# Patient Record
Sex: MALE | Race: WHITE
[De-identification: names, ages, dates, MRNs, and addresses within clinical notes are randomized per-mention and may not be internally consistent; named-entity substitution may affect disease eponyms.]

---

## 2019-11-27 ENCOUNTER — HOSPITAL ENCOUNTER (INPATIENT)
Dept: HOSPITAL 92 - ERS | Age: 44
LOS: 1 days | Discharge: HOME | DRG: 66 | End: 2019-11-28
Payer: COMMERCIAL

## 2019-11-27 VITALS — BODY MASS INDEX: 35.3 KG/M2

## 2019-11-27 DIAGNOSIS — R40.2142: ICD-10-CM

## 2019-11-27 DIAGNOSIS — E78.5: ICD-10-CM

## 2019-11-27 DIAGNOSIS — R40.2252: ICD-10-CM

## 2019-11-27 DIAGNOSIS — G89.29: ICD-10-CM

## 2019-11-27 DIAGNOSIS — H53.8: ICD-10-CM

## 2019-11-27 DIAGNOSIS — R40.2352: ICD-10-CM

## 2019-11-27 DIAGNOSIS — I10: ICD-10-CM

## 2019-11-27 DIAGNOSIS — Z79.82: ICD-10-CM

## 2019-11-27 DIAGNOSIS — I63.81: Primary | ICD-10-CM

## 2019-11-27 DIAGNOSIS — I63.89: ICD-10-CM

## 2019-11-27 LAB
ALBUMIN SERPL BCG-MCNC: 4.7 G/DL (ref 3.5–5)
ALP SERPL-CCNC: 97 U/L (ref 40–110)
ALT SERPL W P-5'-P-CCNC: 54 U/L (ref 8–55)
ANION GAP SERPL CALC-SCNC: 12 MMOL/L (ref 10–20)
APTT PPP: 27.7 SEC (ref 22.9–36.1)
AST SERPL-CCNC: 35 U/L (ref 5–34)
BASOPHILS # BLD AUTO: 0 THOU/UL (ref 0–0.2)
BASOPHILS NFR BLD AUTO: 0.3 % (ref 0–1)
BILIRUB SERPL-MCNC: 0.8 MG/DL (ref 0.2–1.2)
BUN SERPL-MCNC: 18 MG/DL (ref 8.9–20.6)
CALCIUM SERPL-MCNC: 9.6 MG/DL (ref 7.8–10.44)
CHD RISK SERPL-RTO: 6.4 (ref ?–4.5)
CHLORIDE SERPL-SCNC: 102 MMOL/L (ref 98–107)
CHOLEST SERPL-MCNC: 217 MG/DL
CO2 SERPL-SCNC: 28 MMOL/L (ref 22–29)
CREAT CL PREDICTED SERPL C-G-VRATE: 0 ML/MIN (ref 70–130)
D DIMER PPP FEU-MCNC: (no result) *MCG/ML (ref 0.27–0.43)
EOSINOPHIL # BLD AUTO: 0 THOU/UL (ref 0–0.7)
EOSINOPHIL NFR BLD AUTO: 0.5 % (ref 0–10)
GLOBULIN SER CALC-MCNC: 2.9 G/DL (ref 2.4–3.5)
GLUCOSE SERPL-MCNC: 153 MG/DL (ref 70–105)
HDLC SERPL-MCNC: 34 MG/DL
HGB BLD-MCNC: 15 G/DL (ref 14–18)
INR PPP: 1
LDLC SERPL CALC-MCNC: 137 MG/DL
LYMPHOCYTES # BLD: 2 THOU/UL (ref 1.2–3.4)
LYMPHOCYTES NFR BLD AUTO: 23.8 % (ref 21–51)
MCH RBC QN AUTO: 30 PG (ref 27–31)
MCV RBC AUTO: 86.1 FL (ref 78–98)
MONOCYTES # BLD AUTO: 0.3 THOU/UL (ref 0.11–0.59)
MONOCYTES NFR BLD AUTO: 3.8 % (ref 0–10)
NEUTROPHILS # BLD AUTO: 6.1 THOU/UL (ref 1.4–6.5)
NEUTROPHILS NFR BLD AUTO: 71.5 % (ref 42–75)
PLATELET # BLD AUTO: 218 THOU/UL (ref 130–400)
POTASSIUM SERPL-SCNC: 3.7 MMOL/L (ref 3.5–5.1)
PROTHROMBIN TIME: 13 SEC (ref 12–14.7)
RBC # BLD AUTO: 4.99 MILL/UL (ref 4.7–6.1)
SODIUM SERPL-SCNC: 138 MMOL/L (ref 136–145)
TRIGL SERPL-MCNC: 228 MG/DL (ref ?–150)
TROPONIN I SERPL DL<=0.01 NG/ML-MCNC: (no result) NG/ML (ref ?–0.03)
TROPONIN I SERPL DL<=0.01 NG/ML-MCNC: 0.03 NG/ML (ref ?–0.03)
WBC # BLD AUTO: 8.5 THOU/UL (ref 4.8–10.8)

## 2019-11-27 PROCEDURE — 93005 ELECTROCARDIOGRAM TRACING: CPT

## 2019-11-27 PROCEDURE — 85730 THROMBOPLASTIN TIME PARTIAL: CPT

## 2019-11-27 PROCEDURE — 70450 CT HEAD/BRAIN W/O DYE: CPT

## 2019-11-27 PROCEDURE — 96375 TX/PRO/DX INJ NEW DRUG ADDON: CPT

## 2019-11-27 PROCEDURE — 85379 FIBRIN DEGRADATION QUANT: CPT

## 2019-11-27 PROCEDURE — 71045 X-RAY EXAM CHEST 1 VIEW: CPT

## 2019-11-27 PROCEDURE — 36415 COLL VENOUS BLD VENIPUNCTURE: CPT

## 2019-11-27 PROCEDURE — 80061 LIPID PANEL: CPT

## 2019-11-27 PROCEDURE — 93880 EXTRACRANIAL BILAT STUDY: CPT

## 2019-11-27 PROCEDURE — 96365 THER/PROPH/DIAG IV INF INIT: CPT

## 2019-11-27 PROCEDURE — 85025 COMPLETE CBC W/AUTO DIFF WBC: CPT

## 2019-11-27 PROCEDURE — 80053 COMPREHEN METABOLIC PANEL: CPT

## 2019-11-27 PROCEDURE — 70553 MRI BRAIN STEM W/O & W/DYE: CPT

## 2019-11-27 PROCEDURE — 84484 ASSAY OF TROPONIN QUANT: CPT

## 2019-11-27 PROCEDURE — 85610 PROTHROMBIN TIME: CPT

## 2019-11-27 NOTE — RAD
Chest one view



HISTORY: Altered mental status. Chest pain.



FINDINGS: No comparison. Cardiac silhouette is magnified by projection. Pulmonary vasculature is unre
markable. Mediastinum is midline. No lobar consolidation or evidence of pneumothorax. Cardiac

monitor leads overlie the chest.











IMPRESSION: No active cardiopulmonary abnormalities are demonstrated.



Reported By: YASMANI Lunsford 

Electronically Signed:  11/27/2019 4:12 PM

## 2019-11-27 NOTE — CT
CT BRAIN NONCONTRAST:

11/27/2019

4:23 p.m.

 

HISTORY:

A 44-year-old male with headache. History of recent stroke last Thursday, demonstrated on imaging jacky
dy at outside facility.

 

COMPARISON:

None available.

 

FINDINGS:

There is an approximately 1 x 0.5 cm focus of moderate hypodensity in the left basal ganglia. Ventric
les are normal in size and configuration. No mass effect or midline shift. No acute intra-axial or ex
tra-axial hemorrhage or any other extra-axial fluid collection. The visualized upper portion of the r
ight maxillary sinus is opacified. Mucosal thickening in adjacent right lower ethmoid air cells. The 
sphenoid, left ethmoid and frontal sinuses are grossly clear. Bilateral tympanomastoid cavities are g
rossly clear. No calvarial fracture.

 

IMPRESSION:

1. Lacunar infarction at left basal ganglia, presumably subacute, especially given history.

 

2. No acute intracranial hemorrhage or mass effect.

 

3. Right maxillary sinus mucosal disease, incompletely imaged.

 

EVELYN DAVE

 

POS: MURIEL

## 2019-11-27 NOTE — HP
CHIEF COMPLAINT:  Blurred vision, left-sided, dizziness, headache, and chest 
pain.



HISTORY OF PRESENT ILLNESS:  Mr. Gareth Jj is a 44-year-old  
male

with past medical history of hypertension, hyperlipidemia, and chronic pain.  
He was

in [place] last Thursday, woke up with dizziness and blurred vision in the 
left

side of eye.  He waited for some time, got better and he went home, stayed for 2

days, and then he went to the [facility], where he was admitted 
because of

the decreased vision on the left eye and dizziness.  He was evaluated, had a CT 
scan

and MRI done and he was told he had a CVA.  He also had chest pain, but he did 
not

see any cardiologist there.  He was actually transferred from [facility]

to [facility], where he has stayed for some time for days.  Then, he was

trying to get transferred to [facility], which was unsuccessful, so he

signed out AMA and his wife yumiko him from [place] to [facility] and

presented in the ER.  He still has chest pain this morning, which is pressure-
like,

nonradiating, associated with some dizziness, but no nausea or vomiting.  He 
also

has headache. His visual field defect on the left side is still there.  The 
patient

underwent CT scan here, which showed subacute lacunar infarct in the basal 
ganglia.

He is admitted for further evaluation and he also received IV fluid 1 L bolus 
and

metoclopramide injection. 



PAST MEDICAL HISTORY:  

1. Hypertension.

2. Hyperlipidemia.

3. Chronic pain.



PAST SURGICAL HISTORY:  Nothing significant.



CURRENT MEDICATIONS:  The patient is on;

1. Simvastatin 20 mg daily.

2. Fenofibrate 160 daily.

3. Lisinopril 20 mg b.i.d.

4. Aspirin 81 mg daily.



ALLERGIES:  NKDA.



FAMILY HISTORY:  Nothing contributory.



SOCIAL HISTORY:  The patient lives with family.  No history of smoking.  No 
history

of alcohol. 



REVIEW OF SYSTEMS:  CARDIOVASCULAR: Has chest pain.  No shortness of breath. 

RESPIRATORY: No fever or cough. 

GASTROINTESTINAL:  No nausea or vomiting.  No abdominal pain. 

CENTRAL NERVOUS SYSTEM:  Has headache, dizziness and blurry vision in the left 
eye.



PHYSICAL EXAMINATION:

VITAL SIGNS:  Temperature 98, pulse 85, respirations 20, blood pressure 
initially

160/108, now 130/70. 

HEENT: Head is normocephalic, atraumatic.  Pupils are equal and reactive.

Nasopharynx is pale and dry.  Vision, he has field of vision loss in the left 
eye. 

NECK:  Supple.  No JVD. 

LUNGS:  Bilateral air entry present.  No rales, no rhonchi. 

HEART:  S1 and S2 regular. 

ABDOMEN:  Soft.  No distention.  No tenderness.  Normal bowel sounds. 

RECTAL:  No symptoms. 

NEUROLOGIC:  The patient is alert, awake, oriented x3.  Motor system, power 4/5 
in

all extremities.  Deep tendon reflex 2+ bilaterally.  Plantars downgoing. 

he has visual field defect in the left eye. 



LABORATORY DATA:  CBC shows WBC 8.5, hemoglobin 15, hematocrit 43, platelets 
218.

Metabolic panel; sodium 138, potassium 3.7, chloride 102, CO2 28, urea nitrogen 
18,

creatinine 1.6, glucose 153. 



CT scan of the brain showed lacunar infarct, left basal ganglia.  Chest x-ray

negative. 



ASSESSMENT:  

1. Acute cerebrovascular accident with left visual field loss.

2. Subacute infarct in the left basal ganglia.

3. Chest pain, rule out myocardial infarction.

4. Hypertension.

5. Dizziness and weakness.



PLAN:  

1. Vital signs q.4 hours.

2. Activity as tolerated.

3. Allergies:  NKDA.

4. Hep-Lock.

5. Diet:  Heart healthy.

6. Aspirin 325 mg daily.

7. Plavix 75 mg daily.

8. Lipitor 40 mg at bedtime.

9. Continue home medications.

10. Neurology consult.

11. MRI of the brain.

12. Stress test.







Job ID:  [id number]



[facility]

## 2019-11-28 VITALS — DIASTOLIC BLOOD PRESSURE: 94 MMHG | SYSTOLIC BLOOD PRESSURE: 174 MMHG

## 2019-11-28 VITALS — TEMPERATURE: 98.1 F

## 2019-11-28 LAB
CHD RISK SERPL-RTO: 5.6 (ref ?–4.5)
CHOLEST SERPL-MCNC: 180 MG/DL
HDLC SERPL-MCNC: 32 MG/DL
LDLC SERPL CALC-MCNC: 115 MG/DL
TRIGL SERPL-MCNC: 167 MG/DL (ref ?–150)

## 2019-11-28 NOTE — CON
DATE OF CONSULTATION:  11/28/2019



CONSULTING PHYSICIAN:  [facility].



IMPRESSION:  

1. Left basal ganglia stroke.

2. Clinical exam suggest a right occipital lobe stroke.

3. Hypertension.

4. Reported history of carotid disease.



PLAN:  

1. MRI of the brain.

2. Continue aspirin and statin.

3. Carotid ultrasound.



HISTORY OF PRESENT ILLNESS:  Mr. Servin is a 44-year-old man, who was admitted to

an [facility] hospital with complaints of left occipital headache and blurred vision

on the left.  He reportedly had an MRI of the brain, which confirmed two strokes.

He had an echocardiogram which reportedly was normal.  He was told that he had some

carotid disease.  He was started on aspirin.  He had already been on a statin.  He

was transferred to [place] initially and then his wife picked him up and brought him

to [place].  He has no past history of stroke prior to this.  He was

experiencing some substernal chest pain that radiated back to the scapula.  He was a

bit concerned that it might be cardiac.  They came in here for further evaluation of

this.  Stress test is pending.  Blood pressure was 179/111.  His laboratory study

showed negative troponins.  He has otherwise been stable.  He continues to complain

of a headache and blurred vision on the left. 



PAST MEDICAL HISTORY:  Hypertension, hyperlipidemia.



SOCIAL HISTORY:  No tobacco use.  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



ALLERGIES:  NONE.



MEDICATIONS:  Reviewed.



FAMILY HISTORY:  Positive for coronary artery disease.



REVIEW OF SYSTEMS:  10 system review of systems otherwise negative.



PHYSICAL EXAMINATION:

VITAL SIGNS:  Blood pressure 142/88, pulse 56, respirations 12, temperature 98.1. 

HEENT:  Pupils equal, conjunctivae clear.  Oropharynx clear.  Cranium normocephalic

and atraumatic. 

NECK:  Supple.  No lymphadenopathy. 

EXTREMITIES:  No cyanosis or edema. 

NEUROLOGIC:  He was alert and appropriate.  His speech is fluent and clear.  Cranial

nerve exam showed what appeared to be a left homonymous hemianopsia.  His motor exam

showed no deficits.  There is no tremor or dysmetria.  There are no sensory

deficits.  He can walk independently. 



LABORATORY STUDIES:  Unremarkable CBC, coags, and chemistry panel.  His initial

cholesterol ratio was 6.4, but it was re-checked today and it was 5.6. 



DIAGNOSTIC STUDIES:  EKG shows a sinus rhythm. 



CT scan of the brain was reviewed and shows a subacute area of infarction in the

left basal ganglia.  I do not see any occipital lobe ischemia. 



SUMMARY:  This is a young man __________ with a new stroke.  He has been started on

aspirin.  We need to complete his workup and determine whether if his chest pain is

of any significance. 







Job ID:  [id number]

## 2019-11-28 NOTE — MRI
MRI BRAIN WITH AND WITHOUT CONTRAST:

 

Date:  11/28/19 

 

INDICATION:

Follow-up CT 11/27/19 which indicated lacunar infarct in the left basal ganglia. 

 

FINDINGS:

Ventricles have normal size and position. There are scattered hyperintensities in the deep white katie
er and basal ganglia consistent with chronic ischemic change. There is gliosis in the left basal gang
nikkie; however, no restricted diffusion. This may represent a focus of old lacunar infarct. 

 

Diffusion-weighted images, however, show several scattered foci of restricted diffusion involving the
 right occipital lobe and posterior right temporal lobe in the distribution of the right posterior ce
rebral artery. There are focal areas of cortical involvement indicating acute infarcts in this distri
bution. No abnormal enhancement. 

 

The intracranial internal carotid arteries and proximal cerebral arteries show flow-voids. The basila
r artery is patent. The P1 segment of the right posterior cerebral artery is not well seen and I vinh
ot confirm flow-void in this segment of the right PCA. 

 

IMPRESSION: 

1.  Scattered foci of acute infarct involving the right occipital lobe and posterior right temporal l
obe in a distribution of the right posterior cerebral artery. 

 

2.  There are chronic ischemic changes in the deep white matter and basal ganglia. No other areas of 
acute infarct. 

 

 

POS: OFF

## 2019-11-28 NOTE — ULT
BILATERAL CAROTID DUPLEX ULTRASOUND:



HISTORY: Strokelike symptoms



TECHNIQUE:

Grayscale, color-flow and spectral Doppler ultrasound imaging of the extracranial carotid artery syst
ems and vertebral arteries was performed bilaterally.



FINDINGS:

No large amount of echogenic plaque is seen involving the common carotid or internal carotid arteries
. 



The peak systolic velocity in the right ICA measures 64.2 cm/s.  The peak systolic velocity in the ri
ght CCA measures 69.6 cm/s.



The peak systolic velocity in the left ICA measures  103.8  cm/s.  The peak systolic velocity in the 
left CCA measures  89.2  cm/s.



The right IC/CC ratio is0.92.  The left IC/CC ratio is 1.16.



Vertebral flow:  antegrade, bilaterally.  .



IMPRESSION: No hemodynamically significant stenosis of Both ICAs. 



Reported By: Randy Foley 

Electronically Signed:  11/28/2019 10:49 AM

## 2020-02-05 ENCOUNTER — HOSPITAL ENCOUNTER (OUTPATIENT)
Dept: HOSPITAL 92 - CCL | Age: 45
Discharge: HOME | End: 2020-02-05
Attending: INTERNAL MEDICINE
Payer: COMMERCIAL

## 2020-02-05 VITALS — BODY MASS INDEX: 34.9 KG/M2

## 2020-02-05 DIAGNOSIS — I10: ICD-10-CM

## 2020-02-05 DIAGNOSIS — Z79.82: ICD-10-CM

## 2020-02-05 DIAGNOSIS — E78.5: ICD-10-CM

## 2020-02-05 DIAGNOSIS — Z79.899: ICD-10-CM

## 2020-02-05 DIAGNOSIS — I63.9: Primary | ICD-10-CM

## 2020-02-05 PROCEDURE — B24BZZ4 ULTRASONOGRAPHY OF HEART WITH AORTA, TRANSESOPHAGEAL: ICD-10-PCS | Performed by: INTERNAL MEDICINE

## 2020-02-05 PROCEDURE — 93312 ECHO TRANSESOPHAGEAL: CPT

## 2020-02-05 NOTE — OP
DATE OF PROCEDURE:  02/05/2020



PROCEDURE PERFORMED:  Transesophageal echocardiogram.



PREPROCEDURE DIAGNOSIS:  CVA, unknown etiology.



POSTPROCEDURE DIAGNOSIS:  No significant findings of emboli.



DESCRIPTION OF PROCEDURE:  The patient was consented for the procedure.  Conscious

sedation was performed with propofol.  The probe was passed easily into esophagus. 



FINDINGS:  Overall LVEF estimated at 55% to 60%.  Left ventricle appears to have

normal size and function.  No mass or vegetation present.  The mitral valve is well

visualized.  No mass or vegetation present.  Left atrial appendage also well

visualized.  Normal velocities were noted with normal contraction.  Left atrium also

appeared normal without mass or vegetations.  The aortic valve has 3 cusps.  There

is normal excursion.  No mass or vegetation present in the aortic valve, LVOT, or

aorta.  The RA and RV are of normal size and function.  The tricuspid valve appears

normal.  The pulmonary valve appears normal.  Mobile study also performed showing no

left-to-right or right-to-left shunt.  Aorta with mild atherosclerosis. 



IMPRESSION:  Normal appearing transesophageal echocardiogram with no mass or

vegetation present. 







Job ID:  [id number]

## 2022-06-16 ENCOUNTER — HOSPITAL ENCOUNTER (OUTPATIENT)
Dept: HOSPITAL 92 - ERS | Age: 47
Setting detail: OBSERVATION
LOS: 1 days | Discharge: HOME | End: 2022-06-17
Attending: INTERNAL MEDICINE | Admitting: INTERNAL MEDICINE
Payer: SELF-PAY

## 2022-06-16 VITALS — BODY MASS INDEX: 39.3 KG/M2

## 2022-06-16 DIAGNOSIS — E78.00: ICD-10-CM

## 2022-06-16 DIAGNOSIS — N18.9: ICD-10-CM

## 2022-06-16 DIAGNOSIS — Z20.822: ICD-10-CM

## 2022-06-16 DIAGNOSIS — Z86.711: ICD-10-CM

## 2022-06-16 DIAGNOSIS — I12.9: ICD-10-CM

## 2022-06-16 DIAGNOSIS — I08.1: ICD-10-CM

## 2022-06-16 DIAGNOSIS — Z79.82: ICD-10-CM

## 2022-06-16 DIAGNOSIS — Z79.899: ICD-10-CM

## 2022-06-16 DIAGNOSIS — Z86.73: ICD-10-CM

## 2022-06-16 DIAGNOSIS — R42: ICD-10-CM

## 2022-06-16 DIAGNOSIS — Z79.84: ICD-10-CM

## 2022-06-16 DIAGNOSIS — E11.22: ICD-10-CM

## 2022-06-16 DIAGNOSIS — R51.9: Primary | ICD-10-CM

## 2022-06-16 DIAGNOSIS — E78.1: ICD-10-CM

## 2022-06-16 LAB
ALBUMIN SERPL BCG-MCNC: 4.4 G/DL (ref 3.5–5)
ALP SERPL-CCNC: 63 U/L (ref 40–110)
ALT SERPL W P-5'-P-CCNC: 39 U/L (ref 8–55)
ANION GAP SERPL CALC-SCNC: 14 MMOL/L (ref 10–20)
APTT PPP: 28.8 SEC (ref 22.9–36.1)
AST SERPL-CCNC: 27 U/L (ref 5–34)
BASOPHILS # BLD AUTO: 0 THOU/UL (ref 0–0.2)
BASOPHILS NFR BLD AUTO: 0.3 % (ref 0–1)
BILIRUB SERPL-MCNC: 0.6 MG/DL (ref 0.2–1.2)
BUN SERPL-MCNC: 25 MG/DL (ref 8.9–20.6)
CALCIUM SERPL-MCNC: 9.5 MG/DL (ref 7.8–10.44)
CHLORIDE SERPL-SCNC: 102 MMOL/L (ref 98–107)
CO2 SERPL-SCNC: 26 MMOL/L (ref 22–29)
CREAT CL PREDICTED SERPL C-G-VRATE: 0 ML/MIN (ref 70–130)
EOSINOPHIL # BLD AUTO: 0.1 THOU/UL (ref 0–0.7)
EOSINOPHIL NFR BLD AUTO: 1.3 % (ref 0–10)
GLOBULIN SER CALC-MCNC: 3.1 G/DL (ref 2.4–3.5)
GLUCOSE SERPL-MCNC: 151 MG/DL (ref 70–105)
HGB BLD-MCNC: 15.2 G/DL (ref 14–18)
INR PPP: 1.1
LYMPHOCYTES # BLD: 2.1 THOU/UL (ref 1.2–3.4)
LYMPHOCYTES NFR BLD AUTO: 26.5 % (ref 21–51)
MCH RBC QN AUTO: 28.3 PG (ref 27–31)
MCV RBC AUTO: 86.6 FL (ref 78–98)
MONOCYTES # BLD AUTO: 0.4 THOU/UL (ref 0.11–0.59)
MONOCYTES NFR BLD AUTO: 5.3 % (ref 0–10)
NEUTROPHILS # BLD AUTO: 5.3 THOU/UL (ref 1.4–6.5)
NEUTROPHILS NFR BLD AUTO: 66.7 % (ref 42–75)
PLATELET # BLD AUTO: 217 THOU/UL (ref 130–400)
POTASSIUM SERPL-SCNC: 4.1 MMOL/L (ref 3.5–5.1)
PROTHROMBIN TIME: 13.9 SEC (ref 12–14.7)
RBC # BLD AUTO: 5.37 MILL/UL (ref 4.7–6.1)
SODIUM SERPL-SCNC: 138 MMOL/L (ref 136–145)
WBC # BLD AUTO: 7.9 THOU/UL (ref 4.8–10.8)

## 2022-06-16 PROCEDURE — 84484 ASSAY OF TROPONIN QUANT: CPT

## 2022-06-16 PROCEDURE — 80053 COMPREHEN METABOLIC PANEL: CPT

## 2022-06-16 PROCEDURE — 85610 PROTHROMBIN TIME: CPT

## 2022-06-16 PROCEDURE — U0003 INFECTIOUS AGENT DETECTION BY NUCLEIC ACID (DNA OR RNA); SEVERE ACUTE RESPIRATORY SYNDROME CORONAVIRUS 2 (SARS-COV-2) (CORONAVIRUS DISEASE [COVID-19]), AMPLIFIED PROBE TECHNIQUE, MAKING USE OF HIGH THROUGHPUT TECHNOLOGIES AS DESCRIBED BY CMS-2020-01-R: HCPCS

## 2022-06-16 PROCEDURE — 96375 TX/PRO/DX INJ NEW DRUG ADDON: CPT

## 2022-06-16 PROCEDURE — 80061 LIPID PANEL: CPT

## 2022-06-16 PROCEDURE — 70498 CT ANGIOGRAPHY NECK: CPT

## 2022-06-16 PROCEDURE — 96365 THER/PROPH/DIAG IV INF INIT: CPT

## 2022-06-16 PROCEDURE — 70496 CT ANGIOGRAPHY HEAD: CPT

## 2022-06-16 PROCEDURE — 80048 BASIC METABOLIC PNL TOTAL CA: CPT

## 2022-06-16 PROCEDURE — 93306 TTE W/DOPPLER COMPLETE: CPT

## 2022-06-16 PROCEDURE — G0378 HOSPITAL OBSERVATION PER HR: HCPCS

## 2022-06-16 PROCEDURE — 96376 TX/PRO/DX INJ SAME DRUG ADON: CPT

## 2022-06-16 PROCEDURE — 85025 COMPLETE CBC W/AUTO DIFF WBC: CPT

## 2022-06-16 PROCEDURE — 36415 COLL VENOUS BLD VENIPUNCTURE: CPT

## 2022-06-16 PROCEDURE — 71045 X-RAY EXAM CHEST 1 VIEW: CPT

## 2022-06-16 PROCEDURE — 93005 ELECTROCARDIOGRAM TRACING: CPT

## 2022-06-16 PROCEDURE — 85730 THROMBOPLASTIN TIME PARTIAL: CPT

## 2022-06-16 PROCEDURE — 70551 MRI BRAIN STEM W/O DYE: CPT

## 2022-06-16 PROCEDURE — U0005 INFEC AGEN DETEC AMPLI PROBE: HCPCS

## 2022-06-16 PROCEDURE — 94760 N-INVAS EAR/PLS OXIMETRY 1: CPT

## 2022-06-17 VITALS — TEMPERATURE: 97.8 F

## 2022-06-17 VITALS — SYSTOLIC BLOOD PRESSURE: 154 MMHG | DIASTOLIC BLOOD PRESSURE: 78 MMHG

## 2022-06-17 LAB
ANION GAP SERPL CALC-SCNC: 14 MMOL/L (ref 10–20)
BUN SERPL-MCNC: 20 MG/DL (ref 8.9–20.6)
CALCIUM SERPL-MCNC: 9 MG/DL (ref 7.8–10.44)
CHD RISK SERPL-RTO: 6.3 (ref ?–4.5)
CHLORIDE SERPL-SCNC: 100 MMOL/L (ref 98–107)
CHOLEST SERPL-MCNC: 201 MG/DL
CO2 SERPL-SCNC: 26 MMOL/L (ref 22–29)
CREAT CL PREDICTED SERPL C-G-VRATE: 0 ML/MIN (ref 70–130)
GLUCOSE SERPL-MCNC: 112 MG/DL (ref 70–105)
HDLC SERPL-MCNC: 32 MG/DL
LDLC SERPL CALC-MCNC: 129 MG/DL
POTASSIUM SERPL-SCNC: 3.6 MMOL/L (ref 3.5–5.1)
SODIUM SERPL-SCNC: 136 MMOL/L (ref 136–145)
TRIGL SERPL-MCNC: 201 MG/DL (ref ?–150)

## 2022-06-23 ENCOUNTER — HOSPITAL ENCOUNTER (EMERGENCY)
Dept: HOSPITAL 92 - ERS | Age: 47
Discharge: LEFT BEFORE DISCHARGE | End: 2022-06-23
Payer: COMMERCIAL

## 2022-06-23 DIAGNOSIS — Z53.21: Primary | ICD-10-CM

## 2022-06-23 LAB
ALBUMIN SERPL BCG-MCNC: 4.5 G/DL (ref 3.5–5)
ALP SERPL-CCNC: 66 U/L (ref 40–110)
ALT SERPL W P-5'-P-CCNC: 41 U/L (ref 8–55)
ANION GAP SERPL CALC-SCNC: 17 MMOL/L (ref 10–20)
AST SERPL-CCNC: 23 U/L (ref 5–34)
BASOPHILS # BLD AUTO: 0 THOU/UL (ref 0–0.2)
BASOPHILS NFR BLD AUTO: 0.4 % (ref 0–1)
BILIRUB SERPL-MCNC: 0.5 MG/DL (ref 0.2–1.2)
BUN SERPL-MCNC: 23 MG/DL (ref 8.9–20.6)
CALCIUM SERPL-MCNC: 9.6 MG/DL (ref 7.8–10.44)
CHLORIDE SERPL-SCNC: 103 MMOL/L (ref 98–107)
CO2 SERPL-SCNC: 21 MMOL/L (ref 22–29)
CREAT CL PREDICTED SERPL C-G-VRATE: 0 ML/MIN (ref 70–130)
EOSINOPHIL # BLD AUTO: 0.1 THOU/UL (ref 0–0.7)
EOSINOPHIL NFR BLD AUTO: 1.2 % (ref 0–10)
GLOBULIN SER CALC-MCNC: 3.2 G/DL (ref 2.4–3.5)
GLUCOSE SERPL-MCNC: 139 MG/DL (ref 70–105)
HGB BLD-MCNC: 15.1 G/DL (ref 14–18)
LIPASE SERPL-CCNC: 56 U/L (ref 8–78)
LYMPHOCYTES # BLD: 2.3 THOU/UL (ref 1.2–3.4)
LYMPHOCYTES NFR BLD AUTO: 26.1 % (ref 21–51)
MCH RBC QN AUTO: 28.2 PG (ref 27–31)
MCV RBC AUTO: 87.5 FL (ref 78–98)
MONOCYTES # BLD AUTO: 0.6 THOU/UL (ref 0.11–0.59)
MONOCYTES NFR BLD AUTO: 6.6 % (ref 0–10)
NEUTROPHILS # BLD AUTO: 5.9 THOU/UL (ref 1.4–6.5)
NEUTROPHILS NFR BLD AUTO: 65.7 % (ref 42–75)
PLATELET # BLD AUTO: 229 THOU/UL (ref 130–400)
POTASSIUM SERPL-SCNC: 3.8 MMOL/L (ref 3.5–5.1)
RBC # BLD AUTO: 5.34 MILL/UL (ref 4.7–6.1)
SODIUM SERPL-SCNC: 137 MMOL/L (ref 136–145)
WBC # BLD AUTO: 8.9 THOU/UL (ref 4.8–10.8)

## 2022-06-23 PROCEDURE — 71045 X-RAY EXAM CHEST 1 VIEW: CPT

## 2022-06-23 PROCEDURE — 84484 ASSAY OF TROPONIN QUANT: CPT

## 2022-06-23 PROCEDURE — 36415 COLL VENOUS BLD VENIPUNCTURE: CPT

## 2022-06-23 PROCEDURE — 93005 ELECTROCARDIOGRAM TRACING: CPT

## 2022-06-23 PROCEDURE — 83690 ASSAY OF LIPASE: CPT

## 2022-06-23 PROCEDURE — 80053 COMPREHEN METABOLIC PANEL: CPT

## 2022-06-23 PROCEDURE — 85025 COMPLETE CBC W/AUTO DIFF WBC: CPT

## 2022-06-24 ENCOUNTER — HOSPITAL ENCOUNTER (EMERGENCY)
Dept: HOSPITAL 92 - ERS | Age: 47
Discharge: HOME | End: 2022-06-24
Payer: COMMERCIAL

## 2022-06-24 DIAGNOSIS — I10: ICD-10-CM

## 2022-06-24 DIAGNOSIS — Z79.82: ICD-10-CM

## 2022-06-24 DIAGNOSIS — Z79.899: ICD-10-CM

## 2022-06-24 DIAGNOSIS — R51.9: Primary | ICD-10-CM

## 2022-06-24 DIAGNOSIS — E11.9: ICD-10-CM

## 2022-06-24 DIAGNOSIS — Z79.84: ICD-10-CM

## 2022-06-24 LAB
ALBUMIN SERPL BCG-MCNC: 4.5 G/DL (ref 3.5–5)
ALP SERPL-CCNC: 66 U/L (ref 40–110)
ALT SERPL W P-5'-P-CCNC: 40 U/L (ref 8–55)
ANION GAP SERPL CALC-SCNC: 15 MMOL/L (ref 10–20)
AST SERPL-CCNC: 23 U/L (ref 5–34)
BASOPHILS # BLD AUTO: 0 THOU/UL (ref 0–0.2)
BASOPHILS NFR BLD AUTO: 0.6 % (ref 0–1)
BILIRUB SERPL-MCNC: 0.5 MG/DL (ref 0.2–1.2)
BUN SERPL-MCNC: 26 MG/DL (ref 8.9–20.6)
CALCIUM SERPL-MCNC: 9.7 MG/DL (ref 7.8–10.44)
CHLORIDE SERPL-SCNC: 100 MMOL/L (ref 98–107)
CO2 SERPL-SCNC: 29 MMOL/L (ref 22–29)
CREAT CL PREDICTED SERPL C-G-VRATE: 0 ML/MIN (ref 70–130)
CRP SERPL-MCNC: 0.88 MG/DL
EOSINOPHIL # BLD AUTO: 0.1 THOU/UL (ref 0–0.7)
EOSINOPHIL NFR BLD AUTO: 1.3 % (ref 0–10)
GLOBULIN SER CALC-MCNC: 3.1 G/DL (ref 2.4–3.5)
GLUCOSE SERPL-MCNC: 146 MG/DL (ref 70–105)
HGB BLD-MCNC: 15.5 G/DL (ref 14–18)
LYMPHOCYTES # BLD: 1.9 THOU/UL (ref 1.2–3.4)
LYMPHOCYTES NFR BLD AUTO: 25.9 % (ref 21–51)
MAGNESIUM SERPL-MCNC: 2 MG/DL (ref 1.6–2.6)
MCH RBC QN AUTO: 28.8 PG (ref 27–31)
MCV RBC AUTO: 86.7 FL (ref 78–98)
MONOCYTES # BLD AUTO: 0.4 THOU/UL (ref 0.11–0.59)
MONOCYTES NFR BLD AUTO: 5.7 % (ref 0–10)
NEUTROPHILS # BLD AUTO: 4.8 THOU/UL (ref 1.4–6.5)
NEUTROPHILS NFR BLD AUTO: 66.4 % (ref 42–75)
PLATELET # BLD AUTO: 216 THOU/UL (ref 130–400)
POTASSIUM SERPL-SCNC: 3.9 MMOL/L (ref 3.5–5.1)
RBC # BLD AUTO: 5.39 MILL/UL (ref 4.7–6.1)
SODIUM SERPL-SCNC: 140 MMOL/L (ref 136–145)
WBC # BLD AUTO: 7.3 THOU/UL (ref 4.8–10.8)

## 2022-06-24 PROCEDURE — 85025 COMPLETE CBC W/AUTO DIFF WBC: CPT

## 2022-06-24 PROCEDURE — 93005 ELECTROCARDIOGRAM TRACING: CPT

## 2022-06-24 PROCEDURE — 83735 ASSAY OF MAGNESIUM: CPT

## 2022-06-24 PROCEDURE — 86140 C-REACTIVE PROTEIN: CPT

## 2022-06-24 PROCEDURE — 96375 TX/PRO/DX INJ NEW DRUG ADDON: CPT

## 2022-06-24 PROCEDURE — 80053 COMPREHEN METABOLIC PANEL: CPT

## 2022-06-24 PROCEDURE — 96374 THER/PROPH/DIAG INJ IV PUSH: CPT

## 2022-06-24 PROCEDURE — 84484 ASSAY OF TROPONIN QUANT: CPT

## 2022-06-24 PROCEDURE — 85652 RBC SED RATE AUTOMATED: CPT

## 2022-06-29 ENCOUNTER — HOSPITAL ENCOUNTER (OUTPATIENT)
Dept: HOSPITAL 92 - ULT | Age: 47
Discharge: HOME | End: 2022-06-29
Attending: INTERNAL MEDICINE
Payer: COMMERCIAL

## 2022-06-29 DIAGNOSIS — N18.2: Primary | ICD-10-CM

## 2022-06-29 PROCEDURE — 76770 US EXAM ABDO BACK WALL COMP: CPT

## 2022-06-29 PROCEDURE — 93975 VASCULAR STUDY: CPT

## 2022-07-20 ENCOUNTER — HOSPITAL ENCOUNTER (OUTPATIENT)
Dept: HOSPITAL 92 - CTENTCT | Age: 47
Discharge: HOME | End: 2022-07-20
Attending: OTOLARYNGOLOGY
Payer: COMMERCIAL

## 2022-07-20 DIAGNOSIS — J32.9: Primary | ICD-10-CM

## 2022-07-20 PROCEDURE — 70486 CT MAXILLOFACIAL W/O DYE: CPT

## 2022-09-20 ENCOUNTER — HOSPITAL ENCOUNTER (OUTPATIENT)
Dept: HOSPITAL 92 - SDC | Age: 47
Discharge: HOME | End: 2022-09-20
Attending: OTOLARYNGOLOGY
Payer: COMMERCIAL

## 2022-09-20 VITALS — BODY MASS INDEX: 39.1 KG/M2

## 2022-09-20 DIAGNOSIS — J34.89: Primary | ICD-10-CM

## 2022-09-20 DIAGNOSIS — J32.4: ICD-10-CM

## 2022-09-20 DIAGNOSIS — J34.3: ICD-10-CM

## 2022-09-20 DIAGNOSIS — J34.2: ICD-10-CM

## 2022-09-20 DIAGNOSIS — Z79.84: ICD-10-CM

## 2022-09-20 DIAGNOSIS — J30.89: ICD-10-CM

## 2022-09-20 DIAGNOSIS — E11.9: ICD-10-CM

## 2022-09-20 DIAGNOSIS — Z79.899: ICD-10-CM

## 2022-09-20 DIAGNOSIS — J33.8: ICD-10-CM

## 2022-09-20 DIAGNOSIS — B48.8: ICD-10-CM

## 2022-09-20 DIAGNOSIS — Z20.822: ICD-10-CM

## 2022-09-20 DIAGNOSIS — Z79.82: ICD-10-CM

## 2022-09-20 LAB
ANION GAP SERPL CALC-SCNC: 15 MMOL/L (ref 10–20)
BASOPHILS # BLD AUTO: 0 THOU/UL (ref 0–0.2)
BASOPHILS NFR BLD AUTO: 0.6 % (ref 0–1)
BUN SERPL-MCNC: 18 MG/DL (ref 8.9–20.6)
CALCIUM SERPL-MCNC: 9.4 MG/DL (ref 7.8–10.44)
CHLORIDE SERPL-SCNC: 102 MMOL/L (ref 98–107)
CO2 SERPL-SCNC: 25 MMOL/L (ref 22–29)
CREAT CL PREDICTED SERPL C-G-VRATE: 93 ML/MIN (ref 70–130)
EOSINOPHIL # BLD AUTO: 0.1 THOU/UL (ref 0–0.7)
EOSINOPHIL NFR BLD AUTO: 1 % (ref 0–10)
GLUCOSE SERPL-MCNC: 161 MG/DL (ref 70–105)
HGB BLD-MCNC: 13.8 G/DL (ref 14–18)
LYMPHOCYTES # BLD: 2.1 THOU/UL (ref 1.2–3.4)
LYMPHOCYTES NFR BLD AUTO: 31.2 % (ref 21–51)
MCH RBC QN AUTO: 28.9 PG (ref 27–31)
MCV RBC AUTO: 90 FL (ref 78–98)
MONOCYTES # BLD AUTO: 0.4 THOU/UL (ref 0.11–0.59)
MONOCYTES NFR BLD AUTO: 5.2 % (ref 0–10)
NEUTROPHILS # BLD AUTO: 4.2 THOU/UL (ref 1.4–6.5)
NEUTROPHILS NFR BLD AUTO: 61.9 % (ref 42–75)
PLATELET # BLD AUTO: 253 THOU/UL (ref 130–400)
POTASSIUM SERPL-SCNC: 3.8 MMOL/L (ref 3.5–5.1)
RBC # BLD AUTO: 4.79 MILL/UL (ref 4.7–6.1)
SODIUM SERPL-SCNC: 138 MMOL/L (ref 136–145)
WBC # BLD AUTO: 6.8 THOU/UL (ref 4.8–10.8)

## 2022-09-20 PROCEDURE — 09BS8ZZ EXCISION OF RIGHT FRONTAL SINUS, VIA NATURAL OR ARTIFICIAL OPENING ENDOSCOPIC: ICD-10-PCS | Performed by: OTOLARYNGOLOGY

## 2022-09-20 PROCEDURE — 09BR8ZZ EXCISION OF LEFT MAXILLARY SINUS, VIA NATURAL OR ARTIFICIAL OPENING ENDOSCOPIC: ICD-10-PCS | Performed by: OTOLARYNGOLOGY

## 2022-09-20 PROCEDURE — 93010 ELECTROCARDIOGRAM REPORT: CPT

## 2022-09-20 PROCEDURE — 09BX8ZZ EXCISION OF LEFT SPHENOID SINUS, VIA NATURAL OR ARTIFICIAL OPENING ENDOSCOPIC: ICD-10-PCS | Performed by: OTOLARYNGOLOGY

## 2022-09-20 PROCEDURE — 09TU8ZZ RESECTION OF RIGHT ETHMOID SINUS, VIA NATURAL OR ARTIFICIAL OPENING ENDOSCOPIC: ICD-10-PCS | Performed by: OTOLARYNGOLOGY

## 2022-09-20 PROCEDURE — S0028 INJECTION, FAMOTIDINE, 20 MG: HCPCS

## 2022-09-20 PROCEDURE — 09BW8ZZ EXCISION OF RIGHT SPHENOID SINUS, VIA NATURAL OR ARTIFICIAL OPENING ENDOSCOPIC: ICD-10-PCS | Performed by: OTOLARYNGOLOGY

## 2022-09-20 PROCEDURE — 09TL0ZZ RESECTION OF NASAL TURBINATE, OPEN APPROACH: ICD-10-PCS | Performed by: OTOLARYNGOLOGY

## 2022-09-20 PROCEDURE — 85025 COMPLETE CBC W/AUTO DIFF WBC: CPT

## 2022-09-20 PROCEDURE — 87811 SARS-COV-2 COVID19 W/OPTIC: CPT

## 2022-09-20 PROCEDURE — 09BQ8ZZ EXCISION OF RIGHT MAXILLARY SINUS, VIA NATURAL OR ARTIFICIAL OPENING ENDOSCOPIC: ICD-10-PCS | Performed by: OTOLARYNGOLOGY

## 2022-09-20 PROCEDURE — 8E09XBZ COMPUTER ASSISTED PROCEDURE OF HEAD AND NECK REGION: ICD-10-PCS | Performed by: OTOLARYNGOLOGY

## 2022-09-20 PROCEDURE — 09QK0ZZ REPAIR NASAL MUCOSA AND SOFT TISSUE, OPEN APPROACH: ICD-10-PCS | Performed by: OTOLARYNGOLOGY

## 2022-09-20 PROCEDURE — 09TV8ZZ RESECTION OF LEFT ETHMOID SINUS, VIA NATURAL OR ARTIFICIAL OPENING ENDOSCOPIC: ICD-10-PCS | Performed by: OTOLARYNGOLOGY

## 2022-09-20 PROCEDURE — 80048 BASIC METABOLIC PNL TOTAL CA: CPT

## 2022-09-20 PROCEDURE — 93005 ELECTROCARDIOGRAM TRACING: CPT

## 2022-09-20 PROCEDURE — 09SM0ZZ REPOSITION NASAL SEPTUM, OPEN APPROACH: ICD-10-PCS | Performed by: OTOLARYNGOLOGY

## 2022-09-20 PROCEDURE — 09BT8ZZ EXCISION OF LEFT FRONTAL SINUS, VIA NATURAL OR ARTIFICIAL OPENING ENDOSCOPIC: ICD-10-PCS | Performed by: OTOLARYNGOLOGY

## 2022-11-02 ENCOUNTER — HOSPITAL ENCOUNTER (OUTPATIENT)
Dept: HOSPITAL 92 - BICULT | Age: 47
Discharge: HOME | End: 2022-11-02
Payer: COMMERCIAL

## 2022-11-02 DIAGNOSIS — M79.18: Primary | ICD-10-CM

## 2022-11-02 DIAGNOSIS — S30.0XXA: ICD-10-CM

## 2022-11-02 PROCEDURE — 76999 ECHO EXAMINATION PROCEDURE: CPT
